# Patient Record
Sex: MALE | Race: WHITE | NOT HISPANIC OR LATINO | Employment: FULL TIME | URBAN - METROPOLITAN AREA
[De-identification: names, ages, dates, MRNs, and addresses within clinical notes are randomized per-mention and may not be internally consistent; named-entity substitution may affect disease eponyms.]

---

## 2020-03-06 ENCOUNTER — LAB REQUISITION (OUTPATIENT)
Dept: LAB | Facility: HOSPITAL | Age: 40
End: 2020-03-06

## 2020-03-06 DIAGNOSIS — Z00.00 ROUTINE GENERAL MEDICAL EXAMINATION AT A HEALTH CARE FACILITY: ICD-10-CM

## 2020-03-06 PROCEDURE — 86481 TB AG RESPONSE T-CELL SUSP: CPT

## 2020-03-08 LAB
TSPOT INTERPRETATION: NEGATIVE
TSPOT NIL CONTROL INTERPRETATION: NORMAL
TSPOT PANEL A: 1
TSPOT PANEL B: 4
TSPOT POS CONTROL INTERPRETATION: NORMAL

## 2021-05-25 PROCEDURE — 99204 OFFICE O/P NEW MOD 45 MIN: CPT | Performed by: EMERGENCY MEDICINE

## 2023-05-18 ENCOUNTER — TREATMENT (OUTPATIENT)
Dept: PHYSICAL THERAPY | Facility: CLINIC | Age: 43
End: 2023-05-18
Payer: COMMERCIAL

## 2023-05-18 ENCOUNTER — TRANSCRIBE ORDERS (OUTPATIENT)
Dept: PHYSICAL THERAPY | Facility: CLINIC | Age: 43
End: 2023-05-18
Payer: COMMERCIAL

## 2023-05-18 DIAGNOSIS — R52 PAIN AGGRAVATED BY WALKING: ICD-10-CM

## 2023-05-18 DIAGNOSIS — S76.191A OTHER SPECIFIED INJURY OF RIGHT QUADRICEPS MUSCLE, FASCIA AND TENDON, INITIAL ENCOUNTER: Primary | ICD-10-CM

## 2023-05-18 DIAGNOSIS — M25.551 PAIN OF RIGHT HIP: ICD-10-CM

## 2023-05-18 NOTE — PROGRESS NOTES
"  Physical Therapy Initial Evaluation and Plan of Care                                               [address] [facility] [address], [address]                                                           [place], [place]                                                         [phone]    Patient: Shreyas Diallo   : 1980  Diagnosis/ICD-10 Code:  Other specified injury of right quadriceps muscle, fascia and tendon, initial encounter [S76.191A]  Referring practitioner: Earl Moon PA-C  Date of Initial Visit: 2023  Today's Date: 2023  Patient seen for 1 sessions           Subjective Questionnaire: LEFS: 45      Subjective Evaluation    History of Present Illness  Date of onset: 2023  Mechanism of injury: Pt reports that he was walking down a flight of stairs when his R foot caught on one of the steps, and after attempting to catch himself, all of his weight went through the front of his R thigh and he felt immediate pain. He went to see an orthopedist and they took an MRI which showed a complete tear of his R quadriceps muscle which completely  his muscle from the tendon.     He states that he was in extreme pain through his R thigh over the past 2 weeks, but this week the pain is significantly decreased and he is able to walk \"much more normally.\" In the past few days he has begun to experience tingling in the posterior aspect of his thigh that is constant.       Patient Occupation:    Precautions and Work Restrictions: NoneQuality of life: good    Pain  Current pain ratin  At best pain ratin  At worst pain ratin  Location: R thigh   Quality: discomfort, needle-like, sharp and tight  Relieving factors: change in position, ice, relaxation and rest  Exacerbated by: ambulation, a quick pivot or turn, increased R knee flexion.  Progression: improved    Social Support  Lives in: multiple-level home  Lives with: spouse    Diagnostic Tests  MRI studies: abnormal " "(complete tear of R quadriceps muscle)    Patient Goals  Patient goals for therapy: decreased edema, decreased pain, increased motion, return to sport/leisure activities and increased strength             Objective          Observations     Right Hip  Positive for edema.     Additional Hip Observation Details  Bruising along R superior thigh and palpable trigger point in R superior rectus femoris muscle    Palpation     Right Tenderness of the rectus femoris.   Trigger point to rectus femoris.     Tenderness     Right Knee   Tenderness in the lateral joint line and medial joint line.     Neurological Testing     Sensation     Hip   Left Hip   Intact: light touch    Right Hip   Hypersensation: light touch    Active Range of Motion   Left Hip   Normal active range of motion    Right Hip   Normal active range of motion  Left Knee   Flexion: 145 degrees   Extensor la degrees     Right Knee   Flexion: 135 (\"very tight\") degrees with pain  Extensor la degrees     Strength/Myotome Testing     Left Hip   Planes of Motion   Flexion: 5  Extension: 5  Abduction: 5    Right Hip   Planes of Motion   Flexion: 4  Extension: 4  Abduction: 4+    Left Knee   Flexion: 5  Extension: 5    Right Knee   Flexion: 4  Extension: 4+    Right Hip Flexibility Comments:   Positive Bo hip flexor test; increased pain/ \"tightness\" with overpressure into knee flexion      Ambulation     Observational Gait   Decreased walking speed and stride length.     Additional Observational Gait Details  Pt ambulated through the clinic on an even surface with symmetrical gait mechanics but demonstrated guarding through his trunk and BLEs and had decreased del          Assessment & Plan     Assessment  Impairments: abnormal or restricted ROM, activity intolerance, impaired physical strength, lacks appropriate home exercise program and pain with function  Functional Limitations: walking and uncomfortable because of pain  Assessment details: Shreyas " is a 43 y/o male reporting to OP PT for initial evaluation following an incident on 5/6/23 in which he experienced a near fall, causing him to put all of his weight into his R thigh. MRI in the following week found that Shreyas has a complete tear in his R quadriceps muscle. Since initial injury, Shreyas reports significant improvement and he is now able to ambulate with symmetrical gait mechanics, ascend/ descend stairs, and his pain levels have decreased. He rates the pain in his R quadriceps muscle as 7/10 at worst and he only experiences the worst pain with specific movements such as pivoting or turning quickly and increased R knee flexion. Upon evaluation, Shreyas demonstrates slight deficits in RLE strength compared to the L and has symmetrical AROM, however has reports of increased pain and tightness at end range knee  flexion. He did not report pain during manual muscle testing, but reported feeling increased pain and discomfort once he was allowed to rest. Shreyas also had observed and measured swelling in his R thigh > L. Patient was educated on benefits of increased and purposeful knee and hip movement to assist with increased healing in the ongoing weeks and HEP was initiated. Skilled OP PT is deemed reasonable and necessary at this time in order to address these deficits, limitations, and impairments which are preventing him from performing ADLs and recreational activities without reports of pain.  Prognosis: good    Goals  Plan Goals: Short Term: 2 weeks  1. Pt will be independent with initial HEP  2. Pt will report >/=50% decrease in R quadriceps pain at rest and during all activity  3. Pt will exhibit symmetrical bilateral knee joint circumference measurement for demonstration of improved R quadriceps healing and decreased swelling in rectus femoris muscle  4. Pt will tolerate >/= 10s of over-pressure into knee flexion during Bo hip flexor stretch for indication of improved healing in R quadricep  muscle and improved AROM    Long Term: 8 weeks  1. Pt will be independent with progressed HEP  2. Pt will demonstrate R knee flexion >/= 140* without reports of increased tightness for indication of proper healing and improved R knee AROM  3. Pt will demonstrate improved RLE strength through MMT scores 5/5 in all planes of motion for indication of ability to perform all ADLs and recreational activities independently without increased pain  4. Pt will perform squat to stand with equal weight bearing through BLEs x 10 reps without reports of increased pain for indication of improved and symmetrical LE strength and AROM and ability to perform recreational activities without pain  5. Pt will demonstrate SLS on the RLE without UE assistance x10s 3/3 trials for indication of ability to perform throwing/ pitching safely without pain     Plan  Therapy options: will be seen for skilled therapy services  Planned modality interventions: cryotherapy and thermotherapy (hydrocollator packs)  Planned therapy interventions: balance/weight-bearing training, functional ROM exercises, gait training, home exercise program, therapeutic activities, stretching, strengthening, neuromuscular re-education and manual therapy  Frequency: 1x week  Duration in weeks: 12  Treatment plan discussed with: patient        Manual Therapy:    0     mins  94140;  Therapeutic Exercise:    10     mins  86967;     Neuromuscular Nova:    0    mins  05537;    Therapeutic Activity:     10     mins  72504;     Gait Trainin     mins  05112;     Ultrasound:     0     mins  66412;    Electrical Stimulation:    0     mins  46014 ( );  Dry Needling     0     mins self-pay    Timed Treatment:   20   mins   Total Treatment:     39   mins    PT SIGNATURE: Jone Guzman PT, DPT  [place] License #: [id number]   Jone Guzman PT, 23, 3:29 PM EDT  DATE TREATMENT INITIATED: 2023    Initial Certification  Certification Period: 2023  I certify  that the therapy services are furnished while this patient is under my care.  The services outlined above are required by this patient, and will be reviewed every 90 days.     PHYSICIAN:       DATE:     Please sign and return via fax to [phone].. Thank you, [facility].

## 2023-05-25 ENCOUNTER — TELEPHONE (OUTPATIENT)
Dept: PHYSICAL THERAPY | Facility: CLINIC | Age: 43
End: 2023-05-25

## 2023-05-25 NOTE — TELEPHONE ENCOUNTER
LEFT MESSAGE WITH PATIENT TO LET HIM KNOW WE WILL HAVE TO CANCEL TODAYS APPT SINCE ARIEL IS OUT SICK. OFFERED TO SCHEDULE HIM TOMORROW IF HE IS AVAILABLE. WAITING TO HEAR BACK FROM THE PATIENT

## 2023-06-02 ENCOUNTER — TREATMENT (OUTPATIENT)
Dept: PHYSICAL THERAPY | Facility: CLINIC | Age: 43
End: 2023-06-02

## 2023-06-02 ENCOUNTER — DOCUMENTATION (OUTPATIENT)
Dept: PHYSICAL THERAPY | Facility: CLINIC | Age: 43
End: 2023-06-02

## 2023-06-02 NOTE — PROGRESS NOTES
Physical Therapy Daily Progress Note                                            [address] [facility] [address], [address]                                                           [place], [place]                                                         [phone]    Patient: Shreyas Diallo   : 1980  Diagnosis/ICD-10 Code:  No primary diagnosis found.  Referring practitioner: Earl Moon PA-C  Date of Initial Visit: Type: THERAPY  Noted: 2023  Today's Date: 2023  Patient seen for 2 sessions           Subjective Evaluation    History of Present Illness    Subjective comment: Shreyas reports that he has had no reports of pain over the past 2 weeks and has not felt limited in any daily or recreational activity.        Objective   See Exercise, Manual, and Modality Logs for complete treatment.     Plan Goals: Short Term: 2 weeks  1. Pt will be independent with initial HEP- MET as stated  2. Pt will report >/=50% decrease in R quadriceps pain at rest and during all activity-MET: pt reports no pain over past 2 weeks  3. Pt will exhibit symmetrical bilateral knee joint circumference measurement for demonstration of improved R quadriceps healing and decreased swelling in rectus femoris muscle-MET: pt exhibits 0 swelling this date  4. Pt will tolerate >/= 10s of over-pressure into knee flexion during Bo hip flexor stretch for indication of improved healing in R quadricep muscle and improved AROM- Not tested this date, however pt able to complete bo hip flexor stretch at home with 0 pain reported    Long Term: 8 weeks  1. Pt will be independent with progressed HEP-MET: pt verbalized and demonstrated understanding of progressed HEP this date  2. Pt will demonstrate R knee flexion >/= 140* without reports of increased tightness for indication of proper healing and improved R knee AROM-MET: Pt observed to have R knee flexion to 140*  3. Pt will demonstrate improved RLE strength through MMT scores 5/5 in all  planes of motion for indication of ability to perform all ADLs and recreational activities independently without increased pain- Not formally tested this date, but pt able to complete 30 repetitions of all RLE strengthening this date without complaints of discomfort or pain  4. Pt will perform squat to stand with equal weight bearing through BLEs x 10 reps without reports of increased pain for indication of improved and symmetrical LE strength and AROM and ability to perform recreational activities without pain- Not tested this date  5. Pt will demonstrate SLS on the RLE without UE assistance x10s 3/3 trials for indication of ability to perform throwing/ pitching safely without pain- Not tested this date     Assessment & Plan     Assessment    Assessment details: Shreyas entered his session today without complaints of pain or discomfort in his R hip or knee and reports that he feels much better. He continues to have slight difficulty when descending stairs though states that he believes this to be more of an issue due to fear than to pain or tightness in his R quadriceps muscle. His HEP was progressed this session and he demonstrated proper body mechanics and understanding of each exercise. Due to Shreyas meeting most of his short and long term goals and his lack of subjective impairment reported, Shreyas is agreeable to be discharged to his HEP this date. He was encouraged to contact our clinic if any questions or concerns are to arise prior to his follow-up with MD.         Other- Discharge this date.            Manual Therapy:    0     mins  23625;  Therapeutic Exercise:    23     mins  69200;     Neuromuscular Nova:    0    mins  45221;    Therapeutic Activity:     0     mins  13406;     Gait Trainin     mins  06962;     Ultrasound:     0     mins  35921;    Electrical Stimulation:    0     mins  27297 ( );  Dry Needling     0     mins self-pay    Timed Treatment:   23   mins   Total Treatment:      23   mins    Jone Guzman, PT, DPT  Physical Therapist  [place] License #: [id number]  Jone Guzman, PT, 06/02/23, 11:00 AM EDT

## 2023-09-20 ENCOUNTER — DOCUMENTATION (OUTPATIENT)
Dept: PHYSICAL THERAPY | Facility: CLINIC | Age: 43
End: 2023-09-20
Payer: COMMERCIAL